# Patient Record
Sex: MALE | Race: WHITE | NOT HISPANIC OR LATINO | URBAN - METROPOLITAN AREA
[De-identification: names, ages, dates, MRNs, and addresses within clinical notes are randomized per-mention and may not be internally consistent; named-entity substitution may affect disease eponyms.]

---

## 2019-12-03 ENCOUNTER — APPOINTMENT (RX ONLY)
Dept: URBAN - METROPOLITAN AREA OTHER 9 | Facility: OTHER | Age: 54
Setting detail: DERMATOLOGY
End: 2019-12-03

## 2019-12-03 DIAGNOSIS — L82.1 OTHER SEBORRHEIC KERATOSIS: ICD-10-CM

## 2019-12-03 DIAGNOSIS — L85.3 XEROSIS CUTIS: ICD-10-CM

## 2019-12-03 DIAGNOSIS — D22 MELANOCYTIC NEVI: ICD-10-CM

## 2019-12-03 DIAGNOSIS — L80 VITILIGO: ICD-10-CM

## 2019-12-03 DIAGNOSIS — L81.4 OTHER MELANIN HYPERPIGMENTATION: ICD-10-CM

## 2019-12-03 PROBLEM — D22.5 MELANOCYTIC NEVI OF TRUNK: Status: ACTIVE | Noted: 2019-12-03

## 2019-12-03 PROBLEM — D22.4 MELANOCYTIC NEVI OF SCALP AND NECK: Status: ACTIVE | Noted: 2019-12-03

## 2019-12-03 PROCEDURE — ? COUNSELING

## 2019-12-03 PROCEDURE — 99213 OFFICE O/P EST LOW 20 MIN: CPT

## 2019-12-03 ASSESSMENT — LOCATION SIMPLE DESCRIPTION DERM
LOCATION SIMPLE: UPPER BACK
LOCATION SIMPLE: LEFT PRETIBIAL REGION
LOCATION SIMPLE: POSTERIOR NECK
LOCATION SIMPLE: RIGHT HAND
LOCATION SIMPLE: RIGHT PRETIBIAL REGION
LOCATION SIMPLE: RIGHT UPPER BACK
LOCATION SIMPLE: CHEST

## 2019-12-03 ASSESSMENT — LOCATION ZONE DERM
LOCATION ZONE: LEG
LOCATION ZONE: NECK
LOCATION ZONE: TRUNK
LOCATION ZONE: HAND

## 2019-12-03 ASSESSMENT — LOCATION DETAILED DESCRIPTION DERM
LOCATION DETAILED: SUPERIOR THORACIC SPINE
LOCATION DETAILED: STERNUM
LOCATION DETAILED: RIGHT SUPERIOR UPPER BACK
LOCATION DETAILED: INFERIOR THORACIC SPINE
LOCATION DETAILED: RIGHT RADIAL DORSAL HAND
LOCATION DETAILED: RIGHT POSTERIOR NECK
LOCATION DETAILED: RIGHT PROXIMAL PRETIBIAL REGION
LOCATION DETAILED: LEFT PROXIMAL PRETIBIAL REGION

## 2019-12-03 ASSESSMENT — SEVERITY VITILIGO: VITILIGO SEVERITY: 1

## 2021-02-09 ENCOUNTER — APPOINTMENT (RX ONLY)
Dept: URBAN - METROPOLITAN AREA CLINIC 12 | Facility: CLINIC | Age: 56
Setting detail: DERMATOLOGY
End: 2021-02-09

## 2021-02-09 DIAGNOSIS — Z41.1 ENCOUNTER FOR COSMETIC SURGERY: ICD-10-CM

## 2021-02-09 PROCEDURE — ? PATIENT SPECIFIC COUNSELING

## 2021-02-09 PROCEDURE — ? CONSULTATION - AGING FACE

## 2021-02-09 ASSESSMENT — LOCATION ZONE DERM: LOCATION ZONE: FACE

## 2021-02-09 ASSESSMENT — LOCATION SIMPLE DESCRIPTION DERM: LOCATION SIMPLE: LEFT CHEEK

## 2021-02-09 ASSESSMENT — LOCATION DETAILED DESCRIPTION DERM: LOCATION DETAILED: LEFT CENTRAL MALAR CHEEK

## 2021-02-09 NOTE — PROCEDURE: PATIENT SPECIFIC COUNSELING
Other (Free Text): Patient presents for consultation aging face. States he would like to discuss surgical and non surgical options. \\n\\nDr. Pascual stepped in, examined patient and discussed several options \\n\\nBrow-lift- helps to release the heaviness in the brow. ( direct brow) \\n\\nLower lid blepharoplasty-  recommended doing a skin pinch only ( no evidence of fatty pad today) skin pinch will help get rid of half of his wrinkles \\n\\nLower face- Gold standard lower face and neck lift discussed where the incision patter will be, explained that this procedure will tighten his neck and define his lower face. Explained that he has very deep nasolabial folds, explained  the face lift will help reduce the folds, however, he will need filler, or fat grafting to help smooth the folds. \\nIf surgery is not an option, recommended fillers and facetite with liposuction.
Detail Level: Zone

## 2021-02-09 NOTE — PROCEDURE: CONSULTATION - AGING FACE
Detail Level: Detailed
Send Procedure Quote As Charge: No
Consultation Charge $ (Use Numbers Only, No Text Please.): 125.22

## 2021-12-14 ENCOUNTER — APPOINTMENT (RX ONLY)
Dept: URBAN - METROPOLITAN AREA CLINIC 12 | Facility: CLINIC | Age: 56
Setting detail: DERMATOLOGY
End: 2021-12-14

## 2021-12-14 DIAGNOSIS — Z41.1 ENCOUNTER FOR COSMETIC SURGERY: ICD-10-CM

## 2021-12-14 PROCEDURE — ? PRE-OP WORKLIST

## 2021-12-14 PROCEDURE — ? PATIENT SPECIFIC COUNSELING

## 2021-12-14 PROCEDURE — ? CONSULTATION - AESTHETIC FACIAL DEFORMITY

## 2021-12-14 PROCEDURE — ? OTHER (COSMETIC)

## 2021-12-14 NOTE — PROCEDURE: OTHER (COSMETIC)
Detail Level: Zone
Sticky Other (Free Text): Per Dr. Garner No Charge
Price $ (Use Numbers Only, No Text Please.): 0.00
Other (Free Text): Retalk FaceLife Consultation and Pre-Op

## 2021-12-14 NOTE — PROCEDURE: PATIENT SPECIFIC COUNSELING
Detail Level: Simple
Other (Free Text): Patient presents today for Retalk FaceLift. Patient voices concerns with wearing CPap Head Strap everynight and having a FaceLift procedure. Dr. Garner stepped in and explained patient will need at least two weeks without wearing the CPAP strap around face, which poses a high risk of skin necrosis due to lack of circulation, highly recommended patient to not risk his health if he is unable to go without device. Dr. Garner Retalked with patient on surgical procedures for Heavy Hooding Eyebrows-Direct BrowLift, Eyelids-Lower Lid Bleph with Skin Pinch, Neck- FaceTite with Liposuction, Cheeks- Fillers every 6-12 months with nurse injector first, if results are not appropriate then patient may consider a surgical procedure to have marionette lines removed to help cheek blend better . Dr. Garner explained that a FaceLift would be the sinclair standard for patient, but these options would be preferred due to wearing CPAP strap. Patient verbalized understanding and was Pre-Op today. RTC PRN.

## 2021-12-14 NOTE — PROCEDURE: PRE-OP WORKLIST
Photos Taken?: yes
Surgery Scheduled: Direct BrowLift with Lower Lid Blepharoplasty with Skin Pinch
Date Of Evaluation: 12/14/21
Detail Level: Simple
Surgeon: Dr. Garner

## 2022-01-07 ENCOUNTER — APPOINTMENT (RX ONLY)
Dept: URBAN - METROPOLITAN AREA CLINIC 12 | Facility: CLINIC | Age: 57
Setting detail: DERMATOLOGY
End: 2022-01-07

## 2022-01-07 DIAGNOSIS — Z41.1 ENCOUNTER FOR COSMETIC SURGERY: ICD-10-CM

## 2022-01-07 PROCEDURE — ? BROW LIFT

## 2022-01-07 PROCEDURE — ? RADIESSE + INJECTION

## 2022-01-07 NOTE — PROCEDURE: BROW LIFT
Skin Closure Sutures: 6-0 Prolene
Price $ (Use Numbers Only, No Text Please.): 2237
Intro: The patient was prepped and draped in standard sterile fashion.
Deep Closure Sutures: 3-0 Monocryl
Galeal Closure Sutures: 4-0 PDS
Consent: The risks, benefits, expectations and alternatives of browlifting and blepharoplasty were discussed in detail with the patient preoperatively, including, but not limited to, the risks of postoperative infection, bleeding, delayed healing, injury to the globe,  scarring, pain, asymmetry, loss of vision, lagophthalmos, corneal abrasion, ectropion, entropion, recurrent brow ptosis, recurrent dermatochalasis, incomplete correction, alopecia, Endotine exposure/migration, possible need for additional procedures, and dissatisfaction with cosmetic outcome. The patient verbalized understanding and nformed consent was obtained.
Skin Closure: running
Skin Closure: simple interrupted
Local Anesthesia Volume In Cc: 0
Preparation: A time-out was taken prior to starting the procedure to identify the patient and surgical sites. An intravenous line was established and cardiac monitors were placed.
Skin Marking: Incisions were marked out with a marking pen approximately 1 mm inferior to the lash line from the level of the inferior punctum and extending laterally from the lateral canthus in a slightly downward direction for approximately 1 cm.
Skin Marking: The area of eyelid skin to be excised from the upper eyelid was marked out with a marking pen.
Skin Marking: Preoperatively, the supraorbital and facial nerves were outlined. A satish was made about 2.5cm posterior to the hairline. A 3 cm incision was made with the middle of it bisected at the temporal crest bilaterally.
Skin Marking: Preoperatively, the supraorbital and facial nerves were outlined. The pretrichial marks were made approximately 3 hairs back and then were directed to the anterior root of the ear within good bearing tissue.
Skin Marking: Using a marking pen, the forehead was marked at the anterior hairline at the midline and approximately 4cm lateral to the midline on each side approximating the highest point of the brow. Vertical incision sites 2cm in length were then marked out at each of these points approximately 1 cm posterior to the hairline. Temporal incisions measuring 2 cm in length were marked out on both sides 1.5cm behind the hairline perpendicular to an imaginary line drawn between the nasal ala and the lateral canthus.
Skin Marking: Using a marking pen, the area of skin to be excised was delineated above each brow. The inferior incision linewas drawn along the superior brow hairline and the superior incision line was drwan at an appropriate position above this at its central aspect.
Hemostasis: electrocautery
Skin Marking: Preoperatively, the supraorbital and facial nerves were outlined. A satish was made approximately 3 centimeters behind the hairline centrally, extending to the anterior ear. A satish was made about 1 centimeter behind this centrally and tapered laterally.
Surgeon: Dr. Hector Garner
Estimated Blood Loss (Cc): minimal
Detail Level: Generalized

## 2022-01-07 NOTE — PROCEDURE: RADIESSE + INJECTION
Vermilion Lips Filler Volume In Cc: 0
Consent: Written consent obtained. Risks include but not limited to bruising, beading, irregular texture, ulceration, infection, allergic reaction, scar formation, incomplete augmentation, temporary nature, procedural pain.
Procedural Text: The filler was administered to the treatment areas noted above.
Use Map Statement For Sites (Optional): No
Number Of Syringes (Required For Inventory): 1
Lot #: [id number]
Detail Level: Detailed
Post-Care Instructions: Patient instructed to apply ice to reduce swelling.
Map Statement: See Attach Map for Complete Details
Filler: Radiesse
Price (Use Numbers Only, No Special Characters Or $): 451
Expiration Date (Month Year): 12-
Price (Use Numbers Only, No Special Characters Or $): 196

## 2022-01-14 ENCOUNTER — APPOINTMENT (RX ONLY)
Dept: URBAN - METROPOLITAN AREA CLINIC 12 | Facility: CLINIC | Age: 57
Setting detail: DERMATOLOGY
End: 2022-01-14

## 2022-01-14 DIAGNOSIS — Z48.89 ENCOUNTER FOR OTHER SPECIFIED SURGICAL AFTERCARE: ICD-10-CM

## 2022-01-14 PROCEDURE — ? COUNSELING - POST-OP CHECK

## 2022-01-14 PROCEDURE — ? PATIENT SPECIFIC COUNSELING

## 2022-01-14 PROCEDURE — 99024 POSTOP FOLLOW-UP VISIT: CPT

## 2022-01-14 PROCEDURE — ? COUNSELING - POST-OP CHECK, BROW LIFT

## 2022-01-14 ASSESSMENT — LOCATION DETAILED DESCRIPTION DERM
LOCATION DETAILED: RIGHT SUPERIOR MEDIAL FOREHEAD
LOCATION DETAILED: SUPERIOR MID FOREHEAD

## 2022-01-14 ASSESSMENT — LOCATION ZONE DERM
LOCATION ZONE: FACE
LOCATION ZONE: FACE

## 2022-01-14 ASSESSMENT — LOCATION SIMPLE DESCRIPTION DERM
LOCATION SIMPLE: SUPERIOR FOREHEAD
LOCATION SIMPLE: RIGHT FOREHEAD

## 2022-01-14 NOTE — PROCEDURE: PATIENT SPECIFIC COUNSELING
Other (Free Text): Patient presents s/p brow lift with bilateral upper and lower lid blepharoplasty. Patient reports doing well. Patient expressed concerns with scarring and bruising.\\n\\Tia Garner stepped in, voiced his incisions look great and healing well. Reassured that the numbness is normal and will resolve with time. Advised to use arnica gel to help with the bruising. Dr. Garner removed all sutures today. Advised than he follow up in1 week
Detail Level: Zone

## 2022-01-18 ENCOUNTER — APPOINTMENT (RX ONLY)
Dept: URBAN - METROPOLITAN AREA CLINIC 12 | Facility: CLINIC | Age: 57
Setting detail: DERMATOLOGY
End: 2022-01-18

## 2022-01-18 DIAGNOSIS — Z48.89 ENCOUNTER FOR OTHER SPECIFIED SURGICAL AFTERCARE: ICD-10-CM

## 2022-01-18 PROCEDURE — 99024 POSTOP FOLLOW-UP VISIT: CPT

## 2022-01-18 PROCEDURE — ? COUNSELING - POST-OP CHECK, BROW LIFT

## 2022-01-18 PROCEDURE — ? COUNSELING - POST-OP CHECK

## 2022-01-18 PROCEDURE — ? PATIENT SPECIFIC COUNSELING

## 2022-01-18 ASSESSMENT — LOCATION DETAILED DESCRIPTION DERM
LOCATION DETAILED: SUPERIOR MID FOREHEAD
LOCATION DETAILED: RIGHT SUPERIOR MEDIAL FOREHEAD

## 2022-01-18 ASSESSMENT — LOCATION ZONE DERM
LOCATION ZONE: FACE
LOCATION ZONE: FACE

## 2022-01-18 ASSESSMENT — LOCATION SIMPLE DESCRIPTION DERM
LOCATION SIMPLE: RIGHT FOREHEAD
LOCATION SIMPLE: SUPERIOR FOREHEAD

## 2022-01-18 NOTE — PROCEDURE: PATIENT SPECIFIC COUNSELING
Other (Free Text): Patient presents s/p brow lift with bilateral upper and lower lid blepharoplasty. Patient reports his incisions opened up over the weekend and was advised by Dr. Garner to come into the office today. \\n\\Tia Garner evaluated patient and explained. The scar on his forehead looks great, and the scar on his upper lid is fine and will heal up great. Reassured that in the end, he will not be able to tell that he had a separation. Patient verbalized understanding. Will follow up in 2 weeks. Sooner if needed.
Detail Level: Zone

## 2022-02-01 ENCOUNTER — APPOINTMENT (RX ONLY)
Dept: URBAN - METROPOLITAN AREA CLINIC 12 | Facility: CLINIC | Age: 57
Setting detail: DERMATOLOGY
End: 2022-02-01

## 2022-02-01 DIAGNOSIS — Z48.89 ENCOUNTER FOR OTHER SPECIFIED SURGICAL AFTERCARE: ICD-10-CM

## 2022-02-01 DIAGNOSIS — Z41.1 ENCOUNTER FOR COSMETIC SURGERY: ICD-10-CM

## 2022-02-01 PROCEDURE — ? PATIENT SPECIFIC COUNSELING

## 2022-02-01 PROCEDURE — 99024 POSTOP FOLLOW-UP VISIT: CPT

## 2022-02-01 PROCEDURE — ? CONSULTATION - AGING FACE

## 2022-02-01 PROCEDURE — ? COUNSELING - POST-OP CHECK, BROW LIFT

## 2022-02-01 PROCEDURE — ? COUNSELING - POST-OP CHECK

## 2022-02-01 ASSESSMENT — LOCATION DETAILED DESCRIPTION DERM
LOCATION DETAILED: RIGHT SUPERIOR MEDIAL FOREHEAD
LOCATION DETAILED: SUBMENTAL CHIN
LOCATION DETAILED: SUPERIOR MID FOREHEAD

## 2022-02-01 ASSESSMENT — LOCATION ZONE DERM
LOCATION ZONE: FACE
LOCATION ZONE: FACE

## 2022-02-01 ASSESSMENT — LOCATION SIMPLE DESCRIPTION DERM
LOCATION SIMPLE: RIGHT FOREHEAD
LOCATION SIMPLE: SUBMENTAL CHIN
LOCATION SIMPLE: SUPERIOR FOREHEAD

## 2022-02-01 NOTE — PROCEDURE: CONSULTATION - AGING FACE
Detail Level: Detailed
Consultation Charge $ (Use Numbers Only, No Text Please.): 0
Send Procedure Quote As Charge: No
Additional Comments: Patient is currently on CPAP. Dr. Garner would like to proceed with caution and consult with Dr. Jaya Garner.

## 2022-02-01 NOTE — PROCEDURE: PATIENT SPECIFIC COUNSELING
Other (Free Text): Patient presents s/p brow lift with bilateral upper and lower lid blepharoplasty. Patient reports no concerns related to healing and recovery. Patient reports no dry eyes.\\n\\nPatient would like to discuss jowl options with Dr. Garner\\n\\nDr. Pascual stepped in the room and\\nFace tite procedure. \\nNeck lift. Minimally invasive procedure.\\nDirect neck lift. Tends to work well in older patients.
Detail Level: Zone

## 2022-03-01 ENCOUNTER — APPOINTMENT (RX ONLY)
Dept: URBAN - METROPOLITAN AREA CLINIC 12 | Facility: CLINIC | Age: 57
Setting detail: DERMATOLOGY
End: 2022-03-01

## 2022-03-01 DIAGNOSIS — Z48.89 ENCOUNTER FOR OTHER SPECIFIED SURGICAL AFTERCARE: ICD-10-CM

## 2022-03-01 DIAGNOSIS — L53.8 OTHER SPECIFIED ERYTHEMATOUS CONDITIONS: ICD-10-CM

## 2022-03-01 PROCEDURE — ? COUNSELING - POST-OP CHECK

## 2022-03-01 PROCEDURE — ? COUNSELING - POST-OP CHECK, BROW LIFT

## 2022-03-01 PROCEDURE — 99024 POSTOP FOLLOW-UP VISIT: CPT

## 2022-03-01 PROCEDURE — ? PATIENT SPECIFIC COUNSELING

## 2022-03-01 PROCEDURE — ? CYNOSURE ICON IPL

## 2022-03-01 ASSESSMENT — LOCATION SIMPLE DESCRIPTION DERM
LOCATION SIMPLE: SUPERIOR FOREHEAD
LOCATION SIMPLE: RIGHT FOREHEAD
LOCATION SIMPLE: LEFT FOREHEAD

## 2022-03-01 ASSESSMENT — LOCATION ZONE DERM
LOCATION ZONE: FACE
LOCATION ZONE: FACE

## 2022-03-01 ASSESSMENT — LOCATION DETAILED DESCRIPTION DERM
LOCATION DETAILED: RIGHT SUPERIOR MEDIAL FOREHEAD
LOCATION DETAILED: SUPERIOR MID FOREHEAD
LOCATION DETAILED: LEFT SUPERIOR FOREHEAD

## 2022-03-01 NOTE — PROCEDURE: PATIENT SPECIFIC COUNSELING
Other (Free Text): Patient presents s/p brow lift with bilateral upper and lower lid blepharoplasty on 1/7/2022. Patient verbalized satisfaction with results. Patient states he has some redness on the right side of his forehead which he state may be due to his sleep position. \\n\\n\\Tia Garner stepped in the room and examined the patient. Advised patient to receive phototherapy to help with redness. Patient verbalized understanding and consenting to the procedure. Patient advised to RTC in 6 weeks.
Detail Level: Zone

## 2022-03-01 NOTE — PROCEDURE: CYNOSURE ICON IPL
Topical Anesthesia?: yes
Passes: 3
Location: full face
Pre-Procedure Care: Prior to the procedure the patient and all present had protective eyewear in place and a warning sign was placed on the door.
Detail Level: Zone
Location: forehead
Fluence: 30
Pulse Duration: 20
Consent: Written consent obtained, risks reviewed including but not limited to pain and incomplete improvement.
Add Post-Care Below To The Note: No
Flaquita: .50
# Of Treatments In Package: 0
Post-Care Instructions: I reviewed with the patient in detail post-care instructions. Patient should avoid sun until area fully healed.
Handpiece: MaxG

## 2022-04-27 ENCOUNTER — APPOINTMENT (RX ONLY)
Dept: URBAN - METROPOLITAN AREA CLINIC 12 | Facility: CLINIC | Age: 57
Setting detail: DERMATOLOGY
End: 2022-04-27

## 2022-04-27 DIAGNOSIS — L53.8 OTHER SPECIFIED ERYTHEMATOUS CONDITIONS: ICD-10-CM

## 2022-04-27 DIAGNOSIS — Z48.89 ENCOUNTER FOR OTHER SPECIFIED SURGICAL AFTERCARE: ICD-10-CM

## 2022-04-27 PROCEDURE — ? COUNSELING - POST-OP CHECK

## 2022-04-27 PROCEDURE — ? COUNSELING - POST-OP CHECK, BROW LIFT

## 2022-04-27 PROCEDURE — 99024 POSTOP FOLLOW-UP VISIT: CPT

## 2022-04-27 PROCEDURE — ? PATIENT SPECIFIC COUNSELING

## 2022-04-27 ASSESSMENT — LOCATION SIMPLE DESCRIPTION DERM
LOCATION SIMPLE: RIGHT FOREHEAD
LOCATION SIMPLE: LEFT FOREHEAD
LOCATION SIMPLE: SUPERIOR FOREHEAD

## 2022-04-27 ASSESSMENT — LOCATION ZONE DERM
LOCATION ZONE: FACE
LOCATION ZONE: FACE

## 2022-04-27 ASSESSMENT — LOCATION DETAILED DESCRIPTION DERM
LOCATION DETAILED: SUPERIOR MID FOREHEAD
LOCATION DETAILED: RIGHT SUPERIOR MEDIAL FOREHEAD
LOCATION DETAILED: LEFT SUPERIOR FOREHEAD

## 2022-04-27 NOTE — PROCEDURE: PATIENT SPECIFIC COUNSELING
Other (Free Text): Patient presents s/p brow lift with bilateral upper and lower lid blepharoplasty on 1/7/2022. Patient verbalized satisfaction with results. Redness has improved. \\n\\Yary. Pascual voiced his brow looks great
Detail Level: Zone

## 2022-09-01 ENCOUNTER — APPOINTMENT (RX ONLY)
Dept: URBAN - METROPOLITAN AREA OTHER 9 | Facility: OTHER | Age: 57
Setting detail: DERMATOLOGY
End: 2022-09-01

## 2022-09-01 DIAGNOSIS — L73.8 OTHER SPECIFIED FOLLICULAR DISORDERS: ICD-10-CM

## 2022-09-01 DIAGNOSIS — L57.8 OTHER SKIN CHANGES DUE TO CHRONIC EXPOSURE TO NONIONIZING RADIATION: ICD-10-CM

## 2022-09-01 DIAGNOSIS — D22 MELANOCYTIC NEVI: ICD-10-CM

## 2022-09-01 DIAGNOSIS — L82.1 OTHER SEBORRHEIC KERATOSIS: ICD-10-CM

## 2022-09-01 PROBLEM — D22.5 MELANOCYTIC NEVI OF TRUNK: Status: ACTIVE | Noted: 2022-09-01

## 2022-09-01 PROBLEM — D22.4 MELANOCYTIC NEVI OF SCALP AND NECK: Status: ACTIVE | Noted: 2022-09-01

## 2022-09-01 PROCEDURE — 99213 OFFICE O/P EST LOW 20 MIN: CPT

## 2022-09-01 PROCEDURE — ? COUNSELING

## 2022-09-01 ASSESSMENT — LOCATION DETAILED DESCRIPTION DERM
LOCATION DETAILED: LEFT DISTAL DORSAL FOREARM
LOCATION DETAILED: LEFT CENTRAL LATERAL NECK
LOCATION DETAILED: RIGHT CLAVICULAR NECK
LOCATION DETAILED: RIGHT CENTRAL MALAR CHEEK
LOCATION DETAILED: RIGHT SUPERIOR UPPER BACK
LOCATION DETAILED: RIGHT DISTAL DORSAL FOREARM
LOCATION DETAILED: RIGHT MEDIAL TRAPEZIAL NECK
LOCATION DETAILED: LEFT SUPERIOR UPPER BACK

## 2022-09-01 ASSESSMENT — LOCATION ZONE DERM
LOCATION ZONE: FACE
LOCATION ZONE: TRUNK
LOCATION ZONE: ARM
LOCATION ZONE: NECK

## 2022-09-01 ASSESSMENT — LOCATION SIMPLE DESCRIPTION DERM
LOCATION SIMPLE: NECK
LOCATION SIMPLE: LEFT FOREARM
LOCATION SIMPLE: RIGHT UPPER BACK
LOCATION SIMPLE: RIGHT FOREARM
LOCATION SIMPLE: POSTERIOR NECK
LOCATION SIMPLE: RIGHT CHEEK
LOCATION SIMPLE: LEFT UPPER BACK
LOCATION SIMPLE: RIGHT ANTERIOR NECK

## 2022-10-03 ENCOUNTER — APPOINTMENT (RX ONLY)
Dept: URBAN - METROPOLITAN AREA CLINIC 12 | Facility: CLINIC | Age: 57
Setting detail: DERMATOLOGY
End: 2022-10-03

## 2022-10-03 DIAGNOSIS — L53.8 OTHER SPECIFIED ERYTHEMATOUS CONDITIONS: ICD-10-CM

## 2022-10-03 DIAGNOSIS — Z41.9 ENCOUNTER FOR PROCEDURE FOR PURPOSES OTHER THAN REMEDYING HEALTH STATE, UNSPECIFIED: ICD-10-CM

## 2022-10-03 PROCEDURE — ? PATIENT SPECIFIC COUNSELING

## 2022-10-03 PROCEDURE — ? RADIESSE + INJECTION

## 2022-10-03 PROCEDURE — ? CYNOSURE ICON IPL

## 2022-10-03 PROCEDURE — ? ADDITIONAL NOTES

## 2022-10-03 ASSESSMENT — LOCATION ZONE DERM: LOCATION ZONE: FACE

## 2022-10-03 ASSESSMENT — LOCATION DETAILED DESCRIPTION DERM: LOCATION DETAILED: LEFT SUPERIOR FOREHEAD

## 2022-10-03 ASSESSMENT — LOCATION SIMPLE DESCRIPTION DERM: LOCATION SIMPLE: LEFT FOREHEAD

## 2022-10-03 NOTE — PROCEDURE: CYNOSURE ICON IPL
Topical Anesthesia?: yes
Passes: 3
Location: full face
Pre-Procedure Care: Prior to the procedure the patient and all present had protective eyewear in place and a warning sign was placed on the door.
Detail Level: Zone
Location: forehead
Fluence: 26
Pulse Duration: 20
Consent: Written consent obtained, risks reviewed including but not limited to pain and incomplete improvement.
Add Post-Care Below To The Note: No
Flaquita: .50
# Of Treatments In Package: 0
Post-Care Instructions: I reviewed with the patient in detail post-care instructions. Patient should avoid sun until area fully healed.
Handpiece: MaxG

## 2022-10-03 NOTE — PROCEDURE: RADIESSE + INJECTION
Use Map Statement For Sites (Optional): No
Additional Area 3 Volume In Cc: 0
Additional Anesthesia Volume In Cc: 6
Consent: Written consent obtained. Risks include but not limited to bruising, beading, irregular texture, ulceration, infection, allergic reaction, scar formation, incomplete augmentation, temporary nature, procedural pain.
Detail Level: Detailed
Include Cannula Size?: 25G
Number Of Syringes (Required For Inventory): 1
Lot #: [id number]
Topical Anesthesia?: 23% lidocaine, 7% tetracaine
Procedural Text: The filler was administered to the treatment areas noted above.
Include Cannula Information In Note?: Yes
Expiration Date (Month Year): 06/2024
Anesthesia Volume In Cc: 0.5
Map Statement: See Attached Map for Complete Details
Post-Care Instructions: Patient instructed to apply ice to reduce swelling.
Nasolabial Folds Filler Volume In Cc: 1.5
Include Cannula Length?: 1.5 inch
Price (Use Numbers Only, No Special Characters Or $): 123
Anesthesia Type: 1% lidocaine with epinephrine
Filler: Radiesse

## 2022-10-03 NOTE — PROCEDURE: ADDITIONAL NOTES
Render Risk Assessment In Note?: no
Additional Notes: Radiesse \\nLot: [id number]\\nExp: 06/2024\\nBarcode number: [id number]

## 2022-10-03 NOTE — PROCEDURE: PATIENT SPECIFIC COUNSELING
Other (Free Text): Patient presents today for continued laser treatment to the left forehead and filler to his Nasolabial folds. Patient also wanted to consult about improving appearance of lower face and neck. \\n\\Tia Garner stepped in and examined patient. Dr. Garner voiced that the patient looks good and results seem to show great improvement. IPL Laser treatment was performed to the left forehead. Dr. Garner examined patient’s lower face and neck and explained that if patient could not go without using his Cpap machine for 7-10 days, surgery would not be the best option for cosmetic treatment. Dr. Garner stated that he was a good candidate for FaceTite to help tighten skin around his lower face and neck. Dr. Garner voiced that this procedure would be a suitable option for some aesthetic improvement. Dr. Garner explained to patient that FaceTite could help address his concerns but, they would not yield the same results as a surgical procedure. Patient stated that he understood and agreed with treatment options. Devora stepped in and provided a quote. During today’s visit patient verbalized concern for the prominent appearance of his nasolabial folds. Dr. Garner recommended filler injections to reduce and improve appearance of the folds. Patient consented to receiving injections today. Dr. Garner added Radiesse to the Nasolabial folds to reduce the prominence of the folds and patient tolerated injections well. Patient advised to f/u 6 weeks for laser treatment and 6 mo for additional filler.
Detail Level: Simple

## 2023-09-07 ENCOUNTER — APPOINTMENT (RX ONLY)
Dept: URBAN - METROPOLITAN AREA OTHER 4 | Facility: OTHER | Age: 58
Setting detail: DERMATOLOGY
End: 2023-09-07

## 2023-09-07 DIAGNOSIS — D18.0 HEMANGIOMA: ICD-10-CM

## 2023-09-07 DIAGNOSIS — I78.8 OTHER DISEASES OF CAPILLARIES: ICD-10-CM

## 2023-09-07 DIAGNOSIS — D22 MELANOCYTIC NEVI: ICD-10-CM

## 2023-09-07 DIAGNOSIS — L57.8 OTHER SKIN CHANGES DUE TO CHRONIC EXPOSURE TO NONIONIZING RADIATION: ICD-10-CM

## 2023-09-07 DIAGNOSIS — L82.1 OTHER SEBORRHEIC KERATOSIS: ICD-10-CM

## 2023-09-07 PROBLEM — D18.01 HEMANGIOMA OF SKIN AND SUBCUTANEOUS TISSUE: Status: ACTIVE | Noted: 2023-09-07

## 2023-09-07 PROBLEM — D22.5 MELANOCYTIC NEVI OF TRUNK: Status: ACTIVE | Noted: 2023-09-07

## 2023-09-07 PROCEDURE — 99213 OFFICE O/P EST LOW 20 MIN: CPT

## 2023-09-07 PROCEDURE — ? SUNSCREEN RECOMMENDATIONS

## 2023-09-07 PROCEDURE — ? COUNSELING

## 2023-09-07 PROCEDURE — ? OBSERVATION

## 2023-09-07 ASSESSMENT — LOCATION SIMPLE DESCRIPTION DERM
LOCATION SIMPLE: RIGHT FOREHEAD
LOCATION SIMPLE: NOSE
LOCATION SIMPLE: RIGHT UPPER BACK
LOCATION SIMPLE: UPPER BACK
LOCATION SIMPLE: RIGHT LOWER BACK
LOCATION SIMPLE: RIGHT HAND
LOCATION SIMPLE: ABDOMEN

## 2023-09-07 ASSESSMENT — LOCATION DETAILED DESCRIPTION DERM
LOCATION DETAILED: RIGHT SUPERIOR MEDIAL UPPER BACK
LOCATION DETAILED: RIGHT INFERIOR MEDIAL FOREHEAD
LOCATION DETAILED: SUPERIOR THORACIC SPINE
LOCATION DETAILED: EPIGASTRIC SKIN
LOCATION DETAILED: RIGHT SUPERIOR MEDIAL MIDBACK
LOCATION DETAILED: NASAL SUPRATIP
LOCATION DETAILED: RIGHT ULNAR DORSAL HAND

## 2023-09-07 ASSESSMENT — LOCATION ZONE DERM
LOCATION ZONE: FACE
LOCATION ZONE: NOSE
LOCATION ZONE: TRUNK
LOCATION ZONE: HAND

## 2025-05-08 NOTE — PROCEDURE: COUNSELING - POST-OP CHECK, BROW LIFT
Current patient location: [address]  [facility] [place] [address]    Is the patient currently in the state of [place]? YES    Visit mode:VIDEO    If the visit is dropped, the patient can be reconnected by: VIDEO VISIT: Send to e-mail at: [email or web address]    Will anyone else be joining the visit? NO  (If patient encounters technical issues they should call [phone])    How would you like to obtain your AVS? MyChart    Are changes needed to the allergy or medication list? Pt stated no changes to allergies and Pt stated no med changes    Are refills needed on medications prescribed by this physician? NO    Rooming Documentation:  Questionnaire(s) completed    Reason for visit: RECHECK     Micheline SOLORIO  
Add Postop Global No-Charge Code (13068)?: no
Detail Level: Simple